# Patient Record
Sex: FEMALE | Race: WHITE | NOT HISPANIC OR LATINO | Employment: FULL TIME | URBAN - METROPOLITAN AREA
[De-identification: names, ages, dates, MRNs, and addresses within clinical notes are randomized per-mention and may not be internally consistent; named-entity substitution may affect disease eponyms.]

---

## 2017-01-25 ENCOUNTER — OFFICE VISIT (OUTPATIENT)
Dept: OTHER | Facility: OUTPATIENT CENTER | Age: 32
End: 2017-01-25

## 2017-01-25 DIAGNOSIS — F41.8 SITUATIONAL ANXIETY: Primary | ICD-10-CM

## 2017-01-25 DIAGNOSIS — F33.1 MAJOR DEPRESSIVE DISORDER, RECURRENT EPISODE, MODERATE (H): ICD-10-CM

## 2017-01-25 DIAGNOSIS — Z63.0 PARTNER RELATIONAL PROBLEM: ICD-10-CM

## 2017-01-25 SDOH — SOCIAL STABILITY - SOCIAL INSECURITY: PROBLEMS IN RELATIONSHIP WITH SPOUSE OR PARTNER: Z63.0

## 2017-01-26 NOTE — PROGRESS NOTES
"Program [facility]   [facility]  [address], [address]  [place] [address]  Outpatient Progress Note    Session Date: 1/25/17  Client Name: Vera Kirk  YOB: 1985  MRN: [id number]  Provider: Maria Dolores Rivers, PhD, LP  Type of Session: Individual  Present in Session: Client only  Number of Minutes: 50  Treatment Plan Due: 06/06/2017    Current Symptoms/Status:  Vera is a 30 year old, who self-identifies as a queer, , partnered, non-monogamous cisgender woman. Since intake, Vera has ended her relationship with primary sexual partner, Wilman, and desires to work on healthy decoupling while still living in the same home. Vera describes a historical pattern of decreased sexual desire and difficulty with intimacy or \"going deeper\" in long-term relationships. In addition to sexual and relationship concerns, Vera endorsed a moderate level of depressive symptoms and mild level of anxiety symptoms, including: anhedonia; feeling down, depressed, or hopeless; fatigue; poor appetite; feeling bad about oneself or that you are a failure; feeling nervous, anxious, or on edge; difficulty controlling worry; and irritability.     Progress Toward Treatment Goals:   Increased healthy communication with ex-partner (who remains a housemate), Wilman. Working to address difficulty with relational compromise, communication, and emotional intimacy, as well as healthy decoupling     Intervention & Response:  Cognitive behavioral, interpersonal, and supportive techniques utilized; Per last session, Vera reported that she did speak with her partner, Jayla, about  red flags  she has felt surrounding Jayla s male partner. Processed this experience, which reportedly went well. Discussed that Vera continues to feel  reactive  around Wilman due to sharing their home post-break-up. Normalized and validated these reactions and the difficulty of the living situation. Discussed ways in which " "Vera and Wilman might connect and spend more quality time together (e.g., cabin trip this weekend). Identified the importance, however, of discussing expectations and boundaries with Wilman up front should the two go on a small trip together. Vera was open and receptive to this feedback. 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 Explored that this seems most related to Vera feeling stagnant and  bored  in her life.  Identified this as a common occurrence for her, especially a desire for  new energy  in her life. Vera shared that she is grateful not to be feeling \"severely\" depressed (as she did last winter) but does want to try some new activities and meet new people to further address the \"low\" feeling. This was reinforced and encouraged. Discussed that a decrease in her work hours (starting this week) should help with socialization and self-care. Vera shared a list of related goals that she created to help manage her anxiety around boredom and  doing nothing.  Discussed using MeetUp to socialize and try new activities, and Vera noted a weaving class that she would like to take. Assisted her in making plans to begin this week. Explored the connection between anxiety/guilt and lack of productivity for Vera, including how this might increase now that she will be working less. Assisted her in creating plans for  accountability  from her housemates, who she will ask to check in with her when she has been  lounging  or  doing nothing  for more than 2 hours on any given day. She identified Jayla as a potential source of support for this accountability goal. Vera reported difficulty scheduling IPC vocational assessment, as previously assigned, but has plans to try again. Overall, she was active and engaged throughout session.      Assignment:  (1). Ongoing: Identifying and noting anxiety levels when in communication with sexual/romantic partners  (2). Ongoing: HeadSpace " mindfulness exercises - twice/week  (3). Begin writing sexual history, ages 0-10 - bring to next session.   (4). Tracking guilt/shame/anxiety and checking in on related assumptions    Diagnosis:  300.09 (F41.8) - Other specified anxiety disorder, generalized anxiety not occuring more days than not  296.32 (F33.1) - Major depressive disorder, recurrent, moderate  V61.10 (Z63.0) - Partner relational concerns    R/O - Generalized anxiety disorder    Interactive Complexity:  None    Plan / Need for Future Services:  Return for family/individual therapy in 1-2 weeks.           Maria Dolores Rivers, PhD, LP  Licensed Psychologist

## 2017-02-22 ENCOUNTER — OFFICE VISIT (OUTPATIENT)
Dept: OTHER | Facility: OUTPATIENT CENTER | Age: 32
End: 2017-02-22

## 2017-02-22 DIAGNOSIS — Z63.0 PARTNER RELATIONAL PROBLEM: ICD-10-CM

## 2017-02-22 DIAGNOSIS — F41.8 SITUATIONAL ANXIETY: Primary | ICD-10-CM

## 2017-02-22 DIAGNOSIS — F33.1 MAJOR DEPRESSIVE DISORDER, RECURRENT EPISODE, MODERATE (H): ICD-10-CM

## 2017-02-22 SDOH — SOCIAL STABILITY - SOCIAL INSECURITY: PROBLEMS IN RELATIONSHIP WITH SPOUSE OR PARTNER: Z63.0

## 2017-02-22 NOTE — MR AVS SNAPSHOT
After Visit Summary   2/22/2017    Vera Kirk    MRN: [id number]           Patient Information     Date Of Birth          1985        Visit Information        Provider Department      2/22/2017 3:00 PM Maria Dolores Rivers, PhD [facility]        Today's Diagnoses     Situational anxiety    -  1    Major depressive disorder, recurrent episode, moderate (H)        Partner relational problem           Follow-ups after your visit        Your next 10 appointments already scheduled     Mar 08, 2017  1:00 PM CST   INDIVIDUAL THERAPY with Maria Dolores Rivers, PhD   [facility] ([facility])    [address]  [address]  [facility] [address]   [phone]            Mar 20, 2017 11:00 AM CDT   INDIVIDUAL THERAPY with Maria Dolores Rivers, PhD   [facility] ([facility])    [address]  [address]  [facility] [address]   [phone]            Apr 06, 2017  2:00 PM CDT   INDIVIDUAL THERAPY with Maria Dolores Rivers, PhD   [facility] ([facility])    [address]  [address]  [facility] [address]   [phone]            Apr 13, 2017  3:00 PM CDT   INDIVIDUAL THERAPY with Maria Dolores Rivers, PhD   [facility] ([facility])    [address]  [address]  [facility] [address]   [phone]              Who to contact     Please call your clinic at [phone] to:    Ask questions about your health    Make or cancel appointments    Discuss your medicines    Learn about your test results    Speak to your doctor   If you have compliments or concerns about an experience at your clinic, or if you wish to file a complaint, please contact [facility] Patient Relations at [phone] or email us at [email or web address]         Additional Information About Your Visit        MyChart Information     Rajendra is an  electronic gateway that provides easy, online access to your medical records. With [employer], you can request a clinic appointment, read your test results, renew a prescription or communicate with your care team.     To sign up for [employer] visit the website at [email or web address]   You will be asked to enter the access code listed below, as well as some personal information. Please follow the directions to create your username and password.     Your access code is: [email or web address]  Expires: 2017  9:49 AM     Your access code will  in 90 days. If you need help or a new code, please contact your [facility] or call [phone] for assistance.        Care EveryWhere ID     This is your Care EveryWhere ID. This could be used by other organizations to access your [facility] medical records  [id number]         Blood Pressure from Last 3 Encounters:   16 112/76   16 112/78   16 111/74    Weight from Last 3 Encounters:   16 95.3 kg (210 lb)   16 99.9 kg (220 lb 5 oz)   16 100.5 kg (221 lb 8 oz)              We Performed the Following     Individual Psychotherapy (38-52 min) [[id number]]        Primary Care Provider Office Phone # Fax #    Luh Mallory [phone]       [facility] [address]  [facility] [address]        Thank you!     Thank you for choosing [facility]  for your care. Our goal is always to provide you with excellent care. Hearing back from our patients is one way we can continue to improve our services. Please take a few minutes to complete the written survey that you may receive in the mail after your visit with us. Thank you!             Your Updated Medication List - Protect others around you: Learn how to safely use, store and throw away your medicines at www.disposemymeds.org.          This list is accurate as of: 17 11:59 PM.  Always use your most recent med list.                    Brand Name Dispense Instructions for use    EPINEPHrine 0.3 MG/0.3ML injection     2 each    Inject 0.3 mLs (0.3 mg) into the muscle once as needed for anaphylaxis       gabapentin 300 MG capsule    NEURONTIN    90 capsule    Take 1 capsule (300 mg) by mouth 3 times daily       LORazepam 1 MG tablet    ATIVAN    6 tablet    Take 1 tablet (1 mg) by mouth every 8 hours as needed for anxiety       metroNIDAZOLE 500 MG tablet    FLAGYL    14 tablet    Take 1 tablet (500 mg) by mouth 2 times daily

## 2017-02-23 NOTE — PROGRESS NOTES
"Program in Human Sexuality   [facility]  [address], [address]  [place] [address]  Outpatient Progress Note    Session Date: 2/22/17  Client Name: Vera Kirk  YOB: 1985  MRN: [id number]  Provider: Maria Dolores Rivers, PhD, LP  Type of Session: Individual  Present in Session: Client only  Number of Minutes: 50  Treatment Plan Due: 06/06/2017    Current Symptoms/Status:  Vera is a 30 year old, who self-identifies as a queer, , partnered, non-monogamous cisgender woman. Since intake, Vera has ended her relationship with primary sexual partner, Wilman, and desires to work on healthy decoupling while still living in the same home. Vera describes a historical pattern of decreased sexual desire and difficulty with intimacy or \"going deeper\" in long-term relationships. In addition to sexual and relationship concerns, Vera endorsed a moderate level of depressive symptoms and mild level of anxiety symptoms, including: anhedonia; feeling down, depressed, or hopeless; fatigue; poor appetite; feeling bad about oneself or that you are a failure; feeling nervous, anxious, or on edge; difficulty controlling worry; and irritability.     Progress Toward Treatment Goals:   Increased healthy communication with ex-partner (who remains a housemate), Wilman. Working to address difficulty with relational compromise, communication, and emotional intimacy, as well as healthy decoupling     Intervention & Response:  Cognitive behavioral, interpersonal, and supportive techniques utilized; Vera reported that she did not accomplish homework assignments, per last session, and this was discussed. Processed Vera's mixed reactions to partner Jayla s pregnancy and anxiety about how Jayla's male partner/father of the baby is now presumably part of Vera s life  forever.  Explored ways that Vera might address her anxieties about co-parenting with Jayla. Celebrated that Vera has been able to " "engage in more self-care and relaxation since she has decreased her work hours (e.g., spending time at a cabin, socializing). She reported largely feeling positive about this, though remains aware of experiencing some anxiety when she is not feeling  productive  enough and/or bored. Discussed how she might develop accountability partners specific to the tasks she would like to accomplish but often does not. Related to this \"to-do\" list, explored the difference between  I should  and  I want to  statements (e.g., that Vera often \"shoulds\" herself about activities that are really optional and meant to be fun). Vera was open to beginning to track \"should\" statements for homework in order to notice how they correspond with her anxiety and difficulty accomplishing goals. Discussed that dynamics with Wilman in the house continue to be somewhat tense due the ex-partners' tendency to have the same processing talks about incompatible communication styles. Identified and discussed the importance, for now, of focusing time spent together away from this piece of the relationship and onto enjoyable events/activities, as this can allow for greater quality time. Vera was open to this feedback and, overall, was active and engaged throughout session.       Assignments:  (1). Ongoing: HeadSpace mindfulness exercises - twice/week  (2). IPC vocational assessment  (3). Tracking \"should\" statements and how these relate to anxiety  Not completed from previous sessions: Begin writing sexual history, ages 0-10 - bring to next session.     Diagnosis:  300.09 (F41.8) - Other specified anxiety disorder, generalized anxiety not occuring more days than not  296.32 (F33.1) - Major depressive disorder, recurrent, moderate  V61.10 (Z63.0) - Partner relational concerns    R/O - Generalized anxiety disorder    Interactive Complexity:  None    Plan / Need for Future Services:  Return for family/individual therapy in 1-2 weeks.           Maria Dolores CAMILO" Silvestre, PhD, LP  Licensed Psychologist

## 2017-03-08 ENCOUNTER — OFFICE VISIT (OUTPATIENT)
Dept: OTHER | Facility: OUTPATIENT CENTER | Age: 32
End: 2017-03-08

## 2017-03-08 DIAGNOSIS — F41.8 SITUATIONAL ANXIETY: Primary | ICD-10-CM

## 2017-03-08 DIAGNOSIS — F33.1 MAJOR DEPRESSIVE DISORDER, RECURRENT EPISODE, MODERATE (H): ICD-10-CM

## 2017-03-08 DIAGNOSIS — Z63.0 PARTNER RELATIONAL PROBLEM: ICD-10-CM

## 2017-03-08 SDOH — SOCIAL STABILITY - SOCIAL INSECURITY: PROBLEMS IN RELATIONSHIP WITH SPOUSE OR PARTNER: Z63.0

## 2017-03-08 NOTE — MR AVS SNAPSHOT
After Visit Summary   3/8/2017    Vera Kirk    MRN: [id number]           Patient Information     Date Of Birth          1985        Visit Information        Provider Department      3/8/2017 1:00 PM Maria Dolores Rivers, PhD [facility]        Today's Diagnoses     Situational anxiety    -  1    Major depressive disorder, recurrent episode, moderate (H)        Partner relational problem           Follow-ups after your visit        Your next 10 appointments already scheduled     Apr 06, 2017  2:00 PM CDT   INDIVIDUAL THERAPY with Maria Dolores Rivers, PhD   [facility] ([facility])    [address]  [address]  [facility] [address]   [phone]            Apr 13, 2017  3:00 PM CDT   INDIVIDUAL THERAPY with Maria Dolores Rivers, PhD   [facility] ([facility])    [address]  [address]  [facility] [address]   [phone]            May 01, 2017  5:00 PM CDT   INDIVIDUAL THERAPY with Maria Dolores Rivers, PhD   [facility] ([facility])    [address]  [address]  [facility] [address]   [phone]            May 22, 2017 10:00 AM CDT   INDIVIDUAL THERAPY with Maria Dolores Rivers, PhD   [facility] ([facility])    [address]  [address]  [facility] [address]   [phone]              Who to contact     Please call your clinic at [phone] to:    Ask questions about your health    Make or cancel appointments    Discuss your medicines    Learn about your test results    Speak to your doctor   If you have compliments or concerns about an experience at your clinic, or if you wish to file a complaint, please contact [facility] Patient Relations at [phone] or email us at [email or web address]         Additional Information About Your Visit        MyChart Information     Rajendra is an  electronic gateway that provides easy, online access to your medical records. With Network, you can request a clinic appointment, read your test results, renew a prescription or communicate with your care team.     To sign up for Network visit the website at [email or web address]   You will be asked to enter the access code listed below, as well as some personal information. Please follow the directions to create your username and password.     Your access code is: [email or web address]  Expires: 2017 10:49 AM     Your access code will  in 90 days. If you need help or a new code, please contact your [facility] or call [phone] for assistance.        Care EveryWhere ID     This is your Care EveryWhere ID. This could be used by other organizations to access your [facility] medical records  [id number]         Blood Pressure from Last 3 Encounters:   16 112/76   16 112/78   16 111/74    Weight from Last 3 Encounters:   16 95.3 kg (210 lb)   16 99.9 kg (220 lb 5 oz)   16 100.5 kg (221 lb 8 oz)              We Performed the Following     Psychotherapy Family/Couple with Patient [[id number]]        Primary Care Provider Office Phone # Fax #    Luh Mallory [phone]       [facility] [address]  [facility] [address]        Thank you!     Thank you for choosing [facility]  for your care. Our goal is always to provide you with excellent care. Hearing back from our patients is one way we can continue to improve our services. Please take a few minutes to complete the written survey that you may receive in the mail after your visit with us. Thank you!             Your Updated Medication List - Protect others around you: Learn how to safely use, store and throw away your medicines at www.disposemymeds.org.          This list is accurate as of: 3/8/17 11:59 PM.  Always use your most recent med  list.                   Brand Name Dispense Instructions for use    EPINEPHrine 0.3 MG/0.3ML injection     2 each    Inject 0.3 mLs (0.3 mg) into the muscle once as needed for anaphylaxis       gabapentin 300 MG capsule    NEURONTIN    90 capsule    Take 1 capsule (300 mg) by mouth 3 times daily       LORazepam 1 MG tablet    ATIVAN    6 tablet    Take 1 tablet (1 mg) by mouth every 8 hours as needed for anxiety       metroNIDAZOLE 500 MG tablet    FLAGYL    14 tablet    Take 1 tablet (500 mg) by mouth 2 times daily

## 2017-03-20 ENCOUNTER — OFFICE VISIT (OUTPATIENT)
Dept: OTHER | Facility: OUTPATIENT CENTER | Age: 32
End: 2017-03-20

## 2017-03-20 DIAGNOSIS — F33.1 MAJOR DEPRESSIVE DISORDER, RECURRENT EPISODE, MODERATE (H): ICD-10-CM

## 2017-03-20 DIAGNOSIS — F41.8 SITUATIONAL ANXIETY: Primary | ICD-10-CM

## 2017-03-20 DIAGNOSIS — Z63.0 PARTNER RELATIONAL PROBLEM: ICD-10-CM

## 2017-03-20 SDOH — SOCIAL STABILITY - SOCIAL INSECURITY: PROBLEMS IN RELATIONSHIP WITH SPOUSE OR PARTNER: Z63.0

## 2017-03-20 NOTE — PROGRESS NOTES
"Program in Human Sexuality   [facility]  [address], [address]  [place] [address]  Outpatient Progress Note    Session Date: 3/08/17  Client Name: Vera Kirk  YOB: 1985  MRN: [id number]  Provider: Maria Dolores Rivers, PhD, LP  Type of Session: Family therapy with client present  Present in Session: Client and client's housemate and ex-partner, Wilman  Number of Minutes: 55  Treatment Plan Due: 06/06/2017    Current Symptoms/Status:  Vera is a 30 year old, who self-identifies as a queer, , partnered, non-monogamous cisgender woman. Since intake, Vera has ended her relationship with primary sexual partner, Wilman, and desires to work on healthy decoupling while still living in the same home. Vera describes a historical pattern of decreased sexual desire and difficulty with intimacy or \"going deeper\" in long-term relationships. In addition to sexual and relationship concerns, Vera endorsed a moderate level of depressive symptoms and mild level of anxiety symptoms, including: anhedonia; feeling down, depressed, or hopeless; fatigue; poor appetite; feeling bad about oneself or that you are a failure; feeling nervous, anxious, or on edge; difficulty controlling worry; and irritability.     Progress Toward Treatment Goals:   Increased healthy communication with ex-partner (who remains a housemate), Wilman. Working to address difficulty with relational compromise, communication, and emotional intimacy, as well as healthy decoupling     Intervention & Response:  Cognitive behavioral, interpersonal, and family systems/therapy techniques utilized; Therapist and family continued to explore and process household and relationship dynamics post-breakup. Both partners identified ongoing tensions related to living together (that also lead to greater anxiety for Vera). Therapist assisted each partner in identifying and exploring the \"lens\" that they bring to the relationship, which " "often results in inaccurate assumptions about the other person's motivations and behaviors, and arguments. For Vera, discussed that this \"lens\" frequently centers around a belief that Wilman is upset/angry/annoyed with her when they are not. Discussed, on the other hand, that Wilman's lens is typically that Vera isn't trying hard in the relationship or putting forth effort to prioritize Wilman. Vera was able to identify how her lens relates to increased anxiety and, in today's session, named feeling anxious in the moment about the discussion of her patterns with Wilman. Discussed beginning to work together to label their \"lenses\" aloud and better identify assumptions in the moment, especially when the relationship feels tense, in order to improve communication. Both partners were open to feedback, active, and engaged throughout session.       Assignments:  (1). Ongoing: HeadSpace mindfulness exercises - twice/week  (2). IPC vocational assessment  (3). Tracking \"should\" statements and how these relate to anxiety  Not completed from previous sessions: Begin writing sexual history, ages 0-10 - bring to next session.     Diagnosis:  300.09 (F41.8) - Other specified anxiety disorder, generalized anxiety not occuring more days than not  296.32 (F33.1) - Major depressive disorder, recurrent, moderate  V61.10 (Z63.0) - Partner relational concerns    R/O - Generalized anxiety disorder    Interactive Complexity:  None    Plan / Need for Future Services:  Return for family/individual therapy in 1-2 weeks.           Maria Dolores Rivers, PhD, LP  Licensed Psychologist  "

## 2017-03-20 NOTE — MR AVS SNAPSHOT
After Visit Summary   3/20/2017    Vera Kirk    MRN: [id number]           Patient Information     Date Of Birth          1985        Visit Information        Provider Department      3/20/2017 11:00 AM Maria Dolores Rivers, PhD [facility]        Today's Diagnoses     Situational anxiety    -  1    Major depressive disorder, recurrent episode, moderate (H)        Partner relational problem           Follow-ups after your visit        Your next 10 appointments already scheduled     Apr 06, 2017  2:00 PM CDT   INDIVIDUAL THERAPY with Maria Dolores Rivers, PhD   [facility] ([facility])    [address]  [address] 75[address]  [facility] [address]   [phone]            Apr 13, 2017  3:00 PM CDT   INDIVIDUAL THERAPY with Maria Dolores Rivers, PhD   [facility] ([facility])    [address]  [address]  [facility] [address]   [phone]            May 01, 2017  5:00 PM CDT   INDIVIDUAL THERAPY with Maria Dolores Rivers, PhD   [facility] ([facility])    [address]  [address]  [facility] [address]   [phone]            May 22, 2017 10:00 AM CDT   INDIVIDUAL THERAPY with Maria Dolores Rivers, PhD   [facility] ([facility])    [address]  [address]  [facility] [address]   [phone]              Who to contact     Please call your clinic at [phone] to:    Ask questions about your health    Make or cancel appointments    Discuss your medicines    Learn about your test results    Speak to your doctor   If you have compliments or concerns about an experience at your clinic, or if you wish to file a complaint, please contact [facility] Patient Relations at [phone] or email us at [email or web address]         Additional Information About Your Visit        MyChart Information     MyChart is  an electronic gateway that provides easy, online access to your medical records. With Upverter, you can request a clinic appointment, read your test results, renew a prescription or communicate with your care team.     To sign up for Upverter visit the website at [email or web address]   You will be asked to enter the access code listed below, as well as some personal information. Please follow the directions to create your username and password.     Your access code is: [email or web address]  Expires: 2017 10:49 AM     Your access code will  in 90 days. If you need help or a new code, please contact your [facility] or call [phone] for assistance.        Care EveryWhere ID     This is your Care EveryWhere ID. This could be used by other organizations to access your [facility] medical records  [id number]         Blood Pressure from Last 3 Encounters:   16 112/76   16 112/78   16 111/74    Weight from Last 3 Encounters:   16 95.3 kg (210 lb)   16 99.9 kg (220 lb 5 oz)   16 100.5 kg (221 lb 8 oz)              We Performed the Following     Individual Psychotherapy (53+ min) [69130]        Primary Care Provider Office Phone # Fax #    Luh Mallory [phone]       [facility] [address]  [facility] [address]        Thank you!     Thank you for choosing [facility]  for your care. Our goal is always to provide you with excellent care. Hearing back from our patients is one way we can continue to improve our services. Please take a few minutes to complete the written survey that you may receive in the mail after your visit with us. Thank you!             Your Updated Medication List - Protect others around you: Learn how to safely use, store and throw away your medicines at www.disposemymeds.org.          This list is accurate as of: 3/20/17  5:35 PM.  Always use your most recent med list.                    Brand Name Dispense Instructions for use    EPINEPHrine 0.3 MG/0.3ML injection     2 each    Inject 0.3 mLs (0.3 mg) into the muscle once as needed for anaphylaxis       gabapentin 300 MG capsule    NEURONTIN    90 capsule    Take 1 capsule (300 mg) by mouth 3 times daily       LORazepam 1 MG tablet    ATIVAN    6 tablet    Take 1 tablet (1 mg) by mouth every 8 hours as needed for anxiety       metroNIDAZOLE 500 MG tablet    FLAGYL    14 tablet    Take 1 tablet (500 mg) by mouth 2 times daily

## 2017-03-20 NOTE — PROGRESS NOTES
"Program in Human Sexuality   [facility]  [address], [address]  [place] [address]  Outpatient Progress Note    Session Date: 3/20/17  Client Name: Vera Kirk  YOB: 1985  MRN: [id number]  Provider: Maria Dolores Rivers, PhD, LP  Type of Session: Individual therapy  Present in Session: Client only  Number of Minutes: 55  Treatment Plan Due: 06/06/2017    Current Symptoms/Status:  Vera is a 30 year old, who self-identifies as a queer, , partnered, non-monogamous cisgender woman. Since intake, Vera has ended her relationship with primary sexual partner, Wilman, and desires to work on healthy decoupling while still living in the same home. Vera describes a historical pattern of decreased sexual desire and difficulty with intimacy or \"going deeper\" in long-term relationships. In addition to sexual and relationship concerns, Vera endorsed a moderate level of depressive symptoms and mild level of anxiety symptoms, including: anhedonia; feeling down, depressed, or hopeless; fatigue; poor appetite; feeling bad about oneself or that you are a failure; feeling nervous, anxious, or on edge; difficulty controlling worry; and irritability.     Progress Toward Treatment Goals:   Increased healthy communication with ex-partner (who remains a housemate), Wilman. Working to address anxiety management, difficulty with relational compromise, communication, and emotional intimacy, as well as healthy decoupling     Intervention & Response:  Cognitive behavioral, interpersonal, and supportive techniques utilized; Therapist and client processed a positive encounter between Vera and Wilman since last (family) session, in which Vera was challenged to  check my lens  and how she was interpreting their conversation. Identified that she found this to be helpful in keeping her more aware of her assumptions about/toward Wilman and their interactions. Discussed intentional steps that Vera has been " "taking lately (e.g., attending pregnancy classes) to feel more connected and a part of her partner Jayla s pregnancy. Identified and discussed Vera's anxiety about not being visible as a co-parent given her lack of biological  attachment  to the baby (in comparison to the baby s biological father, Jayla's other partner). Vera was open to speaking with Jayla about these anxieties and participated in brainstorming about how to best communicate her feelings. She planned to raise the discussion next week when on vacation with Jayla. Therapist and client took time to reassess and discuss Vera's treatment goals, as things are going  pretty well  and Vera noted feeling like she often has little to discuss in individual sessions. Identified and discussed ongoing goals around continuing to improve anxiety, working to connect with Wilman in a positive manner, fitness for both physical health and anxiety reduction, and career concerns. Regarding anxiety, explored Vera's desire to repurpose her previous guilt/shame tracking log to focus more specifically on anxiety symptoms and her tendency to neglect self-care and anxiety management even when she knows it will help her. Explored and identified that this pattern feels related to guilt and has a \"punishing\" quality, in which Vera often believes she \"deserves to\" or \"should\" feel anxious. She was open to beginning this tracking log for homework. Discussed that Vera has not followed up with [employer] vocational assessment and is concerned about a wait list. 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 Identified and normalized feelings of overwhelm about this process, and Vera was open to challenge of her future-focused rumination and that she does not, in reality, know what steps are needed. Overall, she was open to feedback, active, and engaged " throughout session.       Assignments:  (1). Ongoing: HeadSpace mindfulness exercises - twice/week  (2). Schedule [employer] vocational assessment. 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   (3). Anxiety tracking log, including assessment of reduction techniques and whether these are attempted.   Not completed from previous sessions: Begin writing sexual history, ages 0-10 - bring to next session.     Diagnosis:  300.09 (F41.8) - Other specified anxiety disorder, generalized anxiety not occuring more days than not  296.32 (F33.1) - Major depressive disorder, recurrent, moderate  V61.10 (Z63.0) - Partner relational concerns    R/O - Generalized anxiety disorder    Interactive Complexity:  None    Plan / Need for Future Services:  Return for family/individual therapy in 2-3 weeks.           Maria Dolores Rivers, PhD,   Licensed Psychologist

## 2017-04-06 ENCOUNTER — TELEPHONE (OUTPATIENT)
Dept: OTHER | Facility: OUTPATIENT CENTER | Age: 32
End: 2017-04-06

## 2017-04-06 NOTE — TELEPHONE ENCOUNTER
Called Vera to check-in regarding today's no-showed therapy appt. No answer, left voicemail encouraging call back to reschedule.       Maria Dolores Rivers, PhD, LP  Licensed Psychologist

## 2017-04-13 ENCOUNTER — OFFICE VISIT (OUTPATIENT)
Dept: OTHER | Facility: OUTPATIENT CENTER | Age: 32
End: 2017-04-13

## 2017-04-13 DIAGNOSIS — F33.1 MAJOR DEPRESSIVE DISORDER, RECURRENT EPISODE, MODERATE (H): ICD-10-CM

## 2017-04-13 DIAGNOSIS — F41.8 SITUATIONAL ANXIETY: Primary | ICD-10-CM

## 2017-04-13 DIAGNOSIS — Z63.0 PARTNER RELATIONAL PROBLEM: ICD-10-CM

## 2017-04-13 SDOH — SOCIAL STABILITY - SOCIAL INSECURITY: PROBLEMS IN RELATIONSHIP WITH SPOUSE OR PARTNER: Z63.0

## 2017-04-13 NOTE — MR AVS SNAPSHOT
After Visit Summary   4/13/2017    Vera Kirk    MRN: [id number]           Patient Information     Date Of Birth          1985        Visit Information        Provider Department      4/13/2017 3:00 PM Maria Dolores Rivers, PhD [facility]        Today's Diagnoses     Situational anxiety    -  1    Major depressive disorder, recurrent episode, moderate (H)        Partner relational problem           Follow-ups after your visit        Your next 10 appointments already scheduled     May 01, 2017  5:00 PM CDT   INDIVIDUAL THERAPY with Maria Dolores Rivers, PhD   [facility] ([facility])    [address]  [address] 7521  [facility] [address]   [phone]            May 22, 2017 10:00 AM CDT   INDIVIDUAL THERAPY with Maria Dolores Rivers, PhD   [facility] ([facility])    [address]  [address]  [facility] [address]   [phone]              Who to contact     Please call your clinic at [phone] to:    Ask questions about your health    Make or cancel appointments    Discuss your medicines    Learn about your test results    Speak to your doctor   If you have compliments or concerns about an experience at your clinic, or if you wish to file a complaint, please contact [facility] Patient Relations at [phone] or email us at [email or web address]         Additional Information About Your Visit        MyChart Information     JustBook is an electronic gateway that provides easy, online access to your medical records. With JustBook, you can request a clinic appointment, read your test results, renew a prescription or communicate with your care team.     To sign up for Live Matrixt visit the website at [email or web address]   You will be asked to enter the access code listed below, as well as some personal information. Please follow the directions to create your username  and password.     Your access code is: [email or web address]  Expires: 2017 10:49 AM     Your access code will  in 90 days. If you need help or a new code, please contact your [facility] or call [phone] for assistance.        Care EveryWhere ID     This is your Care EveryWhere ID. This could be used by other organizations to access your [facility] medical records  [id number]         Blood Pressure from Last 3 Encounters:   16 112/76   16 112/78   16 111/74    Weight from Last 3 Encounters:   16 95.3 kg (210 lb)   16 99.9 kg (220 lb 5 oz)   16 100.5 kg (221 lb 8 oz)              We Performed the Following     Individual Psychotherapy (53+ min) [28286]        Primary Care Provider Office Phone # Fax #    Luh Mallory [phone]       [place] [id number] [facility] [address]        Thank you!     Thank you for choosing [facility]  for your care. Our goal is always to provide you with excellent care. Hearing back from our patients is one way we can continue to improve our services. Please take a few minutes to complete the written survey that you may receive in the mail after your visit with us. Thank you!             Your Updated Medication List - Protect others around you: Learn how to safely use, store and throw away your medicines at www.disposemymeds.org.          This list is accurate as of: 17 11:59 PM.  Always use your most recent med list.                   Brand Name Dispense Instructions for use    EPINEPHrine 0.3 MG/0.3ML injection     2 each    Inject 0.3 mLs (0.3 mg) into the muscle once as needed for anaphylaxis       gabapentin 300 MG capsule    NEURONTIN    90 capsule    Take 1 capsule (300 mg) by mouth 3 times daily       LORazepam 1 MG tablet    ATIVAN    6 tablet    Take 1 tablet (1 mg) by mouth every 8 hours as needed for anxiety       metroNIDAZOLE 500 MG  tablet    FLAGYL    14 tablet    Take 1 tablet (500 mg) by mouth 2 times daily

## 2017-04-18 NOTE — PROGRESS NOTES
"Program in Human Sexuality   [facility]  [address], [address]  [place] [address]  Outpatient Progress Note    Session Date: 4/13/17  Client Name: Vera Kirk  YOB: 1985  MRN: [id number]  Provider: Maria Dolores Rivers, PhD, LP  Type of Session: Individual therapy  Present in Session: Client only  Number of Minutes: 55  Treatment Plan Due: 06/06/2017    Current Symptoms/Status:  Vera is a 30 year old, who self-identifies as a queer, , partnered, non-monogamous cisgender woman. Since intake, Vera has ended her relationship with primary sexual partner, Wilman, and desires to work on healthy decoupling while still living in the same home. Vera describes a historical pattern of decreased sexual desire and difficulty with intimacy or \"going deeper\" in long-term relationships. In addition to sexual and relationship concerns, Vera endorsed a moderate level of depressive symptoms and mild level of anxiety symptoms, including: anhedonia; feeling down, depressed, or hopeless; fatigue; poor appetite; feeling bad about oneself or that you are a failure; feeling nervous, anxious, or on edge; difficulty controlling worry; and irritability.     Progress Toward Treatment Goals:   Increased healthy communication with ex-partner (who remains a housemate), Wilman. Working to address anxiety management, difficulty with relational compromise, communication, and emotional intimacy, as well as healthy decoupling     Intervention & Response:  Cognitive behavioral, interpersonal, and supportive techniques utilized; Vera shared a positive experience spending time recently with Wilman and reported that the relationship is feeling  less dramatic  and tense. Per last session, she noted touching base about co-parenting with Jayla. Identified and discussed that Vera has not held any co-parenting conversations with both Jayla and the baby's biological father, and is unsure why she would need to do " so. She was receptive to therapist's feedback around planning for co-parenting with all relevant parties, despite her dislike for the biological father. Vera shared that she has not completed any therapy homework. Discussed barriers to completing therapeutic assignments, given this ongoing pattern, and continued to explore themes related to feeling lazy/unmotivated and that she  should  be anxious/depressed. Therapist challenged Vera on the need for more concrete, measurable therapeutic goals related to mental health care. Psychoeducation was provided on the importance of such clarification versus a goal of  feeling better.  Therapist also discussed with Vera stages of change and the importance of personalization in the change/goal-setting process. Vera participated in some brainstorming as to how this might work best for her (e.g., leaving notes about behavior change goals for herself in a conspicuous place). She committed to reading more about goal-setting for homework and further considering methods that may be helpful to her (eg., calendar alerts, leaving messages for herself as reminders, etc.). Validated, reflected, and normalized client's feelings throughout session. Overall, she was open to feedback, active, and engaged throughout session.       Assignments:  (1). Ongoing: HeadSpace mindfulness exercises - twice/week  (2). Schedule [employer] vocational assessment. 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   (3). Anxiety tracking log, including assessment of reduction techniques and whether these are attempted.   Not completed from previous sessions: Begin writing sexual history, ages 0-10 - bring to next session.     Diagnosis:  300.09 (F41.8) - Other specified anxiety disorder, generalized anxiety not occuring more days than not  296.32 (F33.1) - Major depressive disorder, recurrent, moderate  V61.10 (Z63.0) - Partner relational concerns    R/O - Generalized anxiety  disorder    Interactive Complexity:  None    Plan / Need for Future Services:  Return for family/individual therapy in 2-3 weeks.           Maria Dolores Rivers, PhD, LP  Licensed Psychologist

## 2017-04-25 ENCOUNTER — TELEPHONE (OUTPATIENT)
Dept: OTHER | Facility: OUTPATIENT CENTER | Age: 32
End: 2017-04-25

## 2017-07-15 ENCOUNTER — HEALTH MAINTENANCE LETTER (OUTPATIENT)
Age: 32
End: 2017-07-15

## 2017-10-11 ENCOUNTER — TELEPHONE (OUTPATIENT)
Dept: FAMILY MEDICINE | Facility: CLINIC | Age: 32
End: 2017-10-11

## 2017-10-11 NOTE — TELEPHONE ENCOUNTER
Pt is past due for f/u pap smear.  Reminder letter was sent 03/08/17.  LMTC and schedule at [facility].  Left this writer's number in case of questions ([phone]).  If no reply and/or appt within 2 weeks (10/25/17) pt will be considered lost to pap tracking f/u.  Lois Cervantes,    Pap Tracking

## 2018-06-28 ENCOUNTER — MEDICAL CORRESPONDENCE (OUTPATIENT)
Dept: HEALTH INFORMATION MANAGEMENT | Facility: CLINIC | Age: 33
End: 2018-06-28

## 2018-07-22 ENCOUNTER — HEALTH MAINTENANCE LETTER (OUTPATIENT)
Age: 33
End: 2018-07-22

## 2018-08-14 ENCOUNTER — DOCUMENTATION ONLY (OUTPATIENT)
Dept: OTHER | Facility: OUTPATIENT CENTER | Age: 33
End: 2018-08-14

## 2018-08-15 NOTE — PROGRESS NOTES
Case Summary Record    Name:  Vera Kirk  Diagnosis:   300.09 (F41.8) - Other specified anxiety disorder, generalized anxiety not occuring more days than not  296.32 (F33.1) - Major depressive disorder, recurrent, moderate  V61.10 (Z63.0) - Partner relational concerns  R/O - Generalized anxiety disorder    Date of Summary:  8/14/2018   Date of Initial [facility] Appointment:  4/6/2016  Date of Last [facility] Appointment:  4/13/2017    Evaluation of Client Status    Total number of Individual, conjoint, group sessions patient attended:  20+ individual and family therapy sessions    Goals at intake were:   Assess and provide treatment suggestions  Prepare for therapy focused on mental and relationship health    At the time of discharge, the client reported the following progress:  N/A    Therapist Assessment:  Increased healthy communication with ex-partner (who remains a housemate). Working to address anxiety/stress management, difficulty with relational compromise, communication, and emotional intimacy, as well as healthy decoupling.     Prognosis:    Good    Referred to: N/A; Client did not return to clinic after letter was sent (see chart) regarding continued care. Case is being closed due to inactivity.         Maria Dolores Rivers, PhD, LP  Licensed Psychologist

## 2020-12-04 ENCOUNTER — VIRTUAL VISIT (OUTPATIENT)
Dept: FAMILY MEDICINE | Facility: CLINIC | Age: 35
End: 2020-12-04
Payer: COMMERCIAL

## 2020-12-04 VITALS — HEIGHT: 67 IN | WEIGHT: 240 LBS | BODY MASS INDEX: 37.67 KG/M2

## 2020-12-04 DIAGNOSIS — E73.9 LACTOSE INTOLERANCE: ICD-10-CM

## 2020-12-04 DIAGNOSIS — Z11.4 SCREENING FOR HIV (HUMAN IMMUNODEFICIENCY VIRUS): ICD-10-CM

## 2020-12-04 DIAGNOSIS — E66.9 OBESITY (BMI 35.0-39.9 WITHOUT COMORBIDITY): ICD-10-CM

## 2020-12-04 DIAGNOSIS — Z11.59 NEED FOR HEPATITIS C SCREENING TEST: ICD-10-CM

## 2020-12-04 DIAGNOSIS — G89.29 CHRONIC LOW BACK PAIN, UNSPECIFIED BACK PAIN LATERALITY, UNSPECIFIED WHETHER SCIATICA PRESENT: ICD-10-CM

## 2020-12-04 DIAGNOSIS — Z13.1 SCREENING FOR DIABETES MELLITUS: ICD-10-CM

## 2020-12-04 DIAGNOSIS — J34.89 DRY NARES: ICD-10-CM

## 2020-12-04 DIAGNOSIS — Z00.00 HEALTHCARE MAINTENANCE: ICD-10-CM

## 2020-12-04 DIAGNOSIS — Z13.220 SCREENING FOR HYPERLIPIDEMIA: ICD-10-CM

## 2020-12-04 DIAGNOSIS — E55.9 VITAMIN D DEFICIENCY: ICD-10-CM

## 2020-12-04 DIAGNOSIS — Z11.3 ROUTINE SCREENING FOR STI (SEXUALLY TRANSMITTED INFECTION): ICD-10-CM

## 2020-12-04 DIAGNOSIS — G89.29 CHRONIC BILATERAL LOW BACK PAIN WITHOUT SCIATICA: ICD-10-CM

## 2020-12-04 DIAGNOSIS — M54.50 CHRONIC BILATERAL LOW BACK PAIN WITHOUT SCIATICA: ICD-10-CM

## 2020-12-04 DIAGNOSIS — B00.9 HERPES SIMPLEX VIRUS INFECTION: Primary | ICD-10-CM

## 2020-12-04 DIAGNOSIS — M54.50 CHRONIC LOW BACK PAIN, UNSPECIFIED BACK PAIN LATERALITY, UNSPECIFIED WHETHER SCIATICA PRESENT: ICD-10-CM

## 2020-12-04 DIAGNOSIS — Z98.890 HISTORY OF LOOP ELECTRICAL EXCISION PROCEDURE (LEEP): ICD-10-CM

## 2020-12-04 PROCEDURE — 99204 OFFICE O/P NEW MOD 45 MIN: CPT | Mod: GT | Performed by: STUDENT IN AN ORGANIZED HEALTH CARE EDUCATION/TRAINING PROGRAM

## 2020-12-04 RX ORDER — SACCHAROMYCES BOULARDII 250 MG
250 CAPSULE ORAL 2 TIMES DAILY
Qty: 180 CAPSULE | Refills: 0 | COMMUNITY
Start: 2020-12-04 | End: 2024-07-15

## 2020-12-04 RX ORDER — CHOLECALCIFEROL (VITAMIN D3) 125 MCG
3000 CAPSULE ORAL
Qty: 270 TABLET | Refills: 0 | Status: SHIPPED | OUTPATIENT
Start: 2020-12-04 | End: 2024-07-15

## 2020-12-04 RX ORDER — OMEGA-3 FATTY ACIDS/FISH OIL 300-1000MG
200-400 CAPSULE ORAL EVERY 4 HOURS PRN
COMMUNITY
Start: 2020-12-04

## 2020-12-04 RX ORDER — EPINEPHRINE 0.3 MG/.3ML
INJECTION SUBCUTANEOUS
COMMUNITY
Start: 2020-06-17

## 2020-12-04 RX ORDER — CHOLECALCIFEROL (VITAMIN D3) 50 MCG
1 TABLET ORAL DAILY
Qty: 90 TABLET | Refills: 3 | COMMUNITY
Start: 2020-12-04 | End: 2024-07-15

## 2020-12-04 RX ORDER — VALACYCLOVIR HYDROCHLORIDE 1 G/1
1000 TABLET, FILM COATED ORAL DAILY
Qty: 5 TABLET | Refills: 0 | Status: SHIPPED | OUTPATIENT
Start: 2020-12-04 | End: 2024-07-15

## 2020-12-04 RX ORDER — MULTIVIT-MIN/IRON FUM/FOLIC AC 7.5 MG-4
1 TABLET ORAL DAILY
COMMUNITY
Start: 2020-12-04

## 2020-12-04 ASSESSMENT — MIFFLIN-ST. JEOR: SCORE: 1811.26

## 2020-12-04 NOTE — PROGRESS NOTES
Family Medicine Video Visit Note        Assessment and Plan   Vera presents to clinic today as a NEW patient for a complete physical exam. Overall, she is doing quite well.     1. Herpes simplex virus infection, latent  Patient-reported history of lab-confirmed HSV1. Never had genital outbreak. Outbreaks once-five times per year.   - valACYclovir (VALTREX) 1000 mg tablet; Take 1 tablet (1,000 mg) by mouth daily for 5 days  Dispense: 5 tablet; Refill: 0    2. Chronic bilateral low back pain, without sciatica present  Has history of bilateral sciatica, but no sciatica currently. Back pain comes and goes and is typically well-managed w/ ibuprofen and massage.   - valACYclovir (VALTREX) 1000 mg tablet; Take 1 tablet (1,000 mg) by mouth daily for 5 days  Dispense: 5 tablet; Refill: 0  - Massage ordered for FSA     3. Healthcare maintenance  - multivitamins w/minerals tablet; Take 1 tablet by mouth daily  Dispense:      4. Vitamin D deficiency  She has a history of vitamin D deficiency per self report and will treat empirically for that, especially [place].   - vitamin D3 (CHOLECALCIFEROL) 50 mcg (2000 units) tablet; Take 1 tablet (50 mcg) by mouth daily  Dispense: 90 tablet; Refill: 3    5. Obesity (BMI 35.0-39.9 without comorbidity)  6. Screening for diabetes mellitus  7. Screening for hyperlipidemia  She is obese with a BMI of 37.6, but does eat a well-balanced diet and tries to remain physically active. Nevertheless, is at increased risk of hyperlipidemia and diabetes, so will screen for both.   - Hemoglobin A1c ([facility]); Future  - Lipid Panel ([facility]); Future    8. Routine screening for STI (sexually transmitted infection)  9. Screening for HIV (human immunodeficiency virus)  She is not currently sexually active, but would like STI screening. Discussed we are deferring low-risk screens at this point for GC/chlamydia, but can test for HIV and syphilis.   - Treponema Abs w Reflex to RPR and  Titer; Future  - HIV Antigen Antibody Combo; Future  - Hepatitis C antibody; Future    10. Need for hepatitis C screening test  - Hepatitis C antibody; Future    11. Dry nares  Dry air in home w/ dry nares and would like humidifier, which is reasonable. Ordered.   - HUMIDIFIER DURABLE GLASS/PLASTIC    12. Lactose intolerance  Not confirmed, but patient reports history of gassiness and diarrhea; especially w/ lactose consumption. Hasn't tried complete elimination. Discussed benefits vs risks of elimination diet vs empiric lactase treatment. Will try lactase and if symptoms improved, suspect lactose intolerant present. If symptoms not improved, would still proceed w/ elimination diet. Pt also asked for probiotic for general gut health, which is reasonable.   - saccharomyces boulardii (FLORASTOR) 250 MG capsule; Take 1 capsule (250 mg) by mouth 2 times daily  Dispense: 180 capsule; Refill: 0  - lactase (LACTAID) 3000 UNIT tablet; Take 1 tablet (3,000 Units) by mouth 3 times daily (with meals)  Dispense: 270 tablet; Refill: 0    13. Reproductive health/screening  14. Contraception counseling  Patient will try to track down records from [facility] re: last pap smear in ~2018. If able to find and NIL/HPV negative, can defer repeat pap until 2023. Otherwise, recommend repeat pap and HPV testing within next year. Not currently on any contraceptive and not sexually active. Patient will seek care if she becomes sexually active and/or desires contraception.     15. Breast cancer screening discussion  Pt asked if due for mammogram. No family history of breast cancer. Discussed briefly not recommended until at least age 40 if no fam hx. Discussed obesity as risk factor. Discussed minimal alcohol use as protective factor. Will discuss further at age 39,     16. Snores  Discussed she is at risk of MAURICE, cam w/ obesity and snoring. Not feeling tired during day or headaches in morning. Could do sleep test in future if pt desires.  Also discussed potential consequences of MAURICE.     Invited to MyChart. Follow-up annually, or earlier with new concerns.   Patient will call to schedule lab-only visit for above las, ordered as future labs.     LUCAS ORTEGA precepted with Dr. Ramos.          PAULINA Gamez presents to clinic today as a NEW patient for a complete physical exam.     New to clinic, but her daughter comes here.   Mostly wants to establish care because she hasn't had a PCP in years. Has a few things she wants to check in on, but knows that we can't do a whole ton of it right now as this is virtual.     Questions:   - Digestive stuff. Possibly test for lactose intolerance? Or answer why digestive stuff screwed up? Has lots of gas and diarrhea. Has tried cutting out some dairy, but still eating cheese and butter and whatnot. Discussed elimination diet vs empiric treatment w/ lactase.   - Lots of skin tags. Why? What do we do?   - Valcyclovir. Would like standing order for prescription to treat cold sores as they come on. Has had cold sores since she was little. Uses valtrex anywhere from once to five times per year. Last use a couple of months ago. Also uses abreva topically.   - Humidifier  - Massage  - Multivitamins  - Vit D- Wants prescription for vit d and wants labs to check levels. Discussed utility of vit d lab is low if asymptomatic, and patient understood and will defer this lab.  - Probiotics  - Labs drawn at some point for cholesterol, blood glucose. No symptoms, but paranoid.   - Keeps meaning to start therapy, but lots going on. Has history of depression, but feels pretty under control right now. Life is just stressful.   - Not currently sexually active. Doesn't have concerns about that right now. Has had ring in past and liked it. Has considered getting another or getting an IUD. Would like STI testing as screening measure. No vaginal burning, itching, or other symptoms. When sexually active (but not currently active),  "has male and female partners. Has had HPV, but s/p LEEP. Hasn't had genital herpes. Hasn't had any STI.   - Has some chronic back pain that comes and goes and is improved with ibuprofen. Some right ankle concerns --if she stresses it, it gets very painful periodically.    Reviewed and updated past medical history, surgical history, family history, and social history, as reflected below and in the EMR.     Past Medical History:   Diagnosis Date     Anxiety      Depressive disorder      H/O LEEP 2005     Herpes 1995     Past Surgical History:   Procedure Laterality Date     COLPOSCOPY CERVIX, LOOP ELECTRODE BIOPSY, COMBINED  2005     HC TOOTH EXTRACTION W/FORCEP  2002    Under general anesthesia. Tolerated without concern.      Family History   Problem Relation Age of Onset     Depression Mother      Hypertension Father      Hyperlipidemia Father      Coronary Artery Disease Father      Hypertension Paternal Grandfather      Hyperlipidemia Paternal Grandfather      Depression Brother      Depression Sister             Social History     Social History     Social History Narrative    As of 2020:     - Works as .     - Lives with daughter (Miles, 2yo), partner, and their three other family members/roommates.     - No tobacco use. Minimal alcohol use (2 drinks per week). No illicit drug use.     - Physical activity: walk a lot on the job, walks the dogs. Used to go to [facility] pre-covid.     - Diet: \"I generally eat pretty well. I eat vegetarian most of the time, but not all of the time. I eat lots of vegetables. I cook dinner pretty much every night!\"        Sexual Health   Sexual concerns: No   STI History: Neg  Pregnancy History:   LMP 2020, periods q28d, last 4d, not too heavy  Last Pap Smear Date: 2018, NIL, HPV negative. All normal paps since LEEP (~). Previously has gone to [facility] for paps and STI testing/     Recommended Screening     Pap/HPV " "cotest every 5 years for women 30-65   See A/P  and HIV screening:  Recommended and patient accepted testing.  Screen for DM and hyperlipidemia d/t obesity.          Review of Systems:   CONSTITUTIONAL: NEGATIVE for fever, chills, change in weight  INTEGUMENTARY/SKIN: NEGATIVE for worrisome rashes, moles or lesions  EYES: NEGATIVE for vision changes or irritation  ENT/MOUTH: **Occasional HSV flares, none in ~2 months. NEGATIVE for ear, mouth and throat problems  RESP: NEGATIVE for significant cough or SOB  BREAST: NEGATIVE for masses, tenderness or discharge  CV: NEGATIVE for chest pain, palpitations or peripheral edema  GI: Has diarrhea a lot and stinky farts a lot. This comes and goes. NEGATIVE for nausea, abdominal pain, heartburn.  : NEGATIVE for frequency, dysuria, or hematuria  MUSCULOSKELETAL: Has some chronic back pain that comes and goes and is improved with ibuprofen. Some right ankle concerns --if she stresses it, it gets very painful periodically. NEGATIVE for significant arthralgias or myalgia  NEURO: NEGATIVE for weakness, dizziness or paresthesias  ENDOCRINE: NEGATIVE for temperature intolerance, skin/hair changes  HEME/ALLERGY: NEGATIVE for bleeding problems  PSYCHIATRIC: NEGATIVE for changes in mood or affect  Sleep:   Do you snore most or the night (as reported by a family member)? Yes  Do you feel sleepy or extremely tired during most of the day? No         Physical Exam:     Vitals: Ht 1.702 m (5' 7\")   Wt 108.9 kg (240 lb)   BMI 37.59 kg/m    BMI= Body mass index is 37.59 kg/m .  LMP 11/20/2020.  General: Patient appears comfortable. Sitting upright and engages easily in conversation. In car (parked) wearing hat and work clothes.   Lungs: Speaking in complete sentences on room air without increased effort.   Psych: Mood good. Affect is congruent. Speech is normal rate and volume. Thought process is logical and goal-oriented.   Neuro: Mental status is normal. Alert and oriented to person, " "place, time, and event.    Skin: No suspicious lesions on exposed skin.   MSK: Observed full ROM in arms.    Eyes: Eyes grossly normal to inspection. Extraocular movements intact. Sclera white.   Neck: Supple. ROM intact.   HENT: Two ears without gross abnormality externally. Nose appears normal. Mouth appears normal with no lesions on lips.           Video Visit Consent   Vera is being evaluated via a billable video visit.    Patient was verbally read the following and verbal consent was obtained.  \"This video visit will be conducted via a call between you and your physician/provider. We have found that certain health care needs can be provided without the need for an in-person physical exam.  This service lets us provide the care you need with a video conversation. If a prescription is necessary we can send it directly to your pharmacy.  If lab work is needed we can place an order for that and you can then stop by our lab to have the test done at a later time.    If during the course of the call the physician/provider feels a video visit is not appropriate, you will not be charged for this service.\"     (Name person giving consent:  Patient   Date verbal consent given:  12/4/2020  Time verbal consent given:  9:12 AM)    Video-Visit Details  Type of service:  Video Visit  Video Start Time: 9:10 AM  Video End Time (time video stopped): 10:00 AM (Did last 31 minutes on phone due to connectivity issues.)  Originating Location (pt. Location): Other Work  Distant Location (provider location):  [facility]  Mode of Communication:  Video Conference via [employer]      "

## 2020-12-24 ENCOUNTER — MYC MEDICAL ADVICE (OUTPATIENT)
Dept: FAMILY MEDICINE | Facility: CLINIC | Age: 35
End: 2020-12-24

## 2020-12-24 RX ORDER — MULTIVIT-MIN/IRON FUM/FOLIC AC 7.5 MG-4
1 TABLET ORAL DAILY
COMMUNITY
Start: 2020-12-24 | End: 2024-07-15

## 2020-12-28 ENCOUNTER — TELEPHONE (OUTPATIENT)
Dept: FAMILY MEDICINE | Facility: CLINIC | Age: 35
End: 2020-12-28

## 2020-12-28 NOTE — TELEPHONE ENCOUNTER
Spoke with patient who needs the orders to send to her [employer]SA account. Orders have been placed into a letter format and released to patient's MyChart per her request.    Leyla Mccallum RN

## 2020-12-28 NOTE — TELEPHONE ENCOUNTER
Patient called stating in order for her FSA to cover her prescriptions she needs a more detailed paper explaining what she is prescribed with her name and the doctor's name. States on [employer] she's able to print the AVS, but FSA is saying that is not enough. States she needs her name and the doctors name and the prescriptions. States the med list is not enough either. Please advise.

## 2021-01-15 ENCOUNTER — HEALTH MAINTENANCE LETTER (OUTPATIENT)
Age: 36
End: 2021-01-15

## 2021-09-18 ENCOUNTER — HEALTH MAINTENANCE LETTER (OUTPATIENT)
Age: 36
End: 2021-09-18

## 2022-11-19 ENCOUNTER — HEALTH MAINTENANCE LETTER (OUTPATIENT)
Age: 37
End: 2022-11-19

## 2023-09-21 ENCOUNTER — OFFICE VISIT (OUTPATIENT)
Dept: URGENT CARE | Facility: URGENT CARE | Age: 38
End: 2023-09-21
Payer: COMMERCIAL

## 2023-09-21 VITALS
HEART RATE: 72 BPM | DIASTOLIC BLOOD PRESSURE: 80 MMHG | TEMPERATURE: 97.6 F | BODY MASS INDEX: 36.02 KG/M2 | OXYGEN SATURATION: 99 % | WEIGHT: 230 LBS | SYSTOLIC BLOOD PRESSURE: 125 MMHG

## 2023-09-21 DIAGNOSIS — M54.50 LUMBOSACRAL PAIN: Primary | ICD-10-CM

## 2023-09-21 PROCEDURE — 99203 OFFICE O/P NEW LOW 30 MIN: CPT | Performed by: PHYSICIAN ASSISTANT

## 2023-09-21 ASSESSMENT — ENCOUNTER SYMPTOMS
DYSURIA: 0
FEVER: 0
BACK PAIN: 1

## 2023-09-21 NOTE — PROGRESS NOTES
SUBJECTIVE:   Vera Eddy is a 38 year old female presenting with a chief complaint of   Chief Complaint   Patient presents with    Urgent Care     Ongoing lower back pain x a month ago injured it   Tried other therapies w/o relief        She is a new patient of [facility].  Patient presents with bilateral lower back pain x 1 month after foam rolling.  Hx of LBP.  No leg pain.  No known back problems.  States worse with sitting.    Treatment:  massage therapist, chiro, ibuprofen (800 BID)  and tylenol     Review of Systems   Constitutional:  Negative for fever.   Genitourinary:  Negative for dysuria.   Musculoskeletal:  Positive for back pain.   Skin:  Negative for rash.   All other systems reviewed and are negative.      Past Medical History:   Diagnosis Date    Anxiety     CARDIOVASCULAR SCREENING; LDL GOAL LESS THAN 160 2/16/2016    Cervical high risk HPV (human papillomavirus) test positive 3/3/16    Depressive disorder     H/O LEEP 01/01/2005    Herpes 01/01/1995     Family History   Problem Relation Age of Onset    Depression Mother     Hypertension Father     Hyperlipidemia Father     Coronary Artery Disease Father     Hypertension Paternal Grandfather     Hyperlipidemia Paternal Grandfather     Depression Brother     Depression Sister     Breast Cancer No family hx of     Cerebrovascular Disease No family hx of      Current Outpatient Medications   Medication Sig Dispense Refill    EPINEPHrine (ANY BX GENERIC EQUIV) 0.3 MG/0.3ML injection 2-pack INJECT 0.3ML INTRAMUSCULARLY ONCE AS NEEDED FOR ANAPHYLAXIS      EPINEPHrine (EPIPEN) 0.3 MG/0.3ML injection Inject 0.3 mLs (0.3 mg) into the muscle once as needed for anaphylaxis 2 each 1    ibuprofen (ADVIL/MOTRIN) 200 MG capsule Take 1-2 capsules (200-400 mg) by mouth every 4 hours as needed for pain or fever      lactase (LACTAID) 3000 UNIT tablet Take 1 tablet (3,000 Units) by mouth 3 times daily (with meals) 270 tablet 0    LORazepam (ATIVAN) 1 MG tablet Take  1 tablet (1 mg) by mouth every 8 hours as needed for anxiety 6 tablet 0    multivitamins w/minerals tablet Take 1 tablet by mouth daily      multivitamins w/minerals tablet Take 1 tablet by mouth daily      saccharomyces boulardii (FLORASTOR) 250 MG capsule Take 1 capsule (250 mg) by mouth 2 times daily 180 capsule 0    tiZANidine (ZANAFLEX) 4 MG tablet Take 1 tablet (4 mg) by mouth 3 times daily 30 tablet 0    vitamin D3 (CHOLECALCIFEROL) 50 mcg (2000 units) tablet Take 1 tablet (50 mcg) by mouth daily 90 tablet 3    gabapentin (NEURONTIN) 300 MG capsule Take 1 capsule (300 mg) by mouth 3 times daily 90 capsule 1    metroNIDAZOLE (FLAGYL) 500 MG tablet Take 1 tablet (500 mg) by mouth 2 times daily 14 tablet 0    valACYclovir (VALTREX) 1000 mg tablet Take 1 tablet (1,000 mg) by mouth daily for 5 days 5 tablet 0     Social History     Tobacco Use    Smoking status: Never    Smokeless tobacco: Never   Substance Use Topics    Alcohol use: Yes     Comment: 2 drinks per week       OBJECTIVE  /80   Pulse 72   Temp 97.6  F (36.4  C) (Tympanic)   Wt 104.3 kg (230 lb)   SpO2 99%   BMI 36.02 kg/m      Physical Exam  Vitals and nursing note reviewed.   Constitutional:       Appearance: Normal appearance. She is obese.   Cardiovascular:      Rate and Rhythm: Normal rate.   Musculoskeletal:      Comments: Patient is able to ambulate, heel walk, toe walk, flex, extend, side flex and twist normally.  SLR negative strength in lower extremities normal.  Warmth to extremities.  Skin over back normal.  No tenderness to bony spine.  Bilateral PSIS tenderness.   Neurological:      Mental Status: She is alert.         Labs:  No results found for this or any previous visit (from the past 24 hour(s)).    X-Ray was not done.    ASSESSMENT:      ICD-10-CM    1. Lumbosacral pain  M54.50 tiZANidine (ZANAFLEX) 4 MG tablet     Physical Therapy Referral           Medical Decision Making:    Differential Diagnosis:  Back Pain:  lumbosacral strain    Serious Comorbid Conditions:  Adult:   reviewed    PLAN:    Ibuprofen TID, PT and rx for zanaflex.  No driving or etoh while taking zanaflex.  Follow up with PCP.  Discussed reasons to seek immediate medical attention.        Followup:    If not improving or if condition worsens, follow up with your Primary Care Provider, If not improving or if conditions worsens over the next 12-24 hours, go to the Emergency Department    There are no Patient Instructions on file for this visit.

## 2024-01-28 ENCOUNTER — HEALTH MAINTENANCE LETTER (OUTPATIENT)
Age: 39
End: 2024-01-28

## 2024-06-16 ENCOUNTER — HEALTH MAINTENANCE LETTER (OUTPATIENT)
Age: 39
End: 2024-06-16

## 2024-07-15 ENCOUNTER — OFFICE VISIT (OUTPATIENT)
Dept: URGENT CARE | Facility: URGENT CARE | Age: 39
End: 2024-07-15
Payer: COMMERCIAL

## 2024-07-15 VITALS
SYSTOLIC BLOOD PRESSURE: 112 MMHG | RESPIRATION RATE: 18 BRPM | OXYGEN SATURATION: 98 % | HEART RATE: 70 BPM | DIASTOLIC BLOOD PRESSURE: 77 MMHG | TEMPERATURE: 98.7 F | WEIGHT: 228 LBS | BODY MASS INDEX: 35.71 KG/M2

## 2024-07-15 DIAGNOSIS — L02.224 BOIL OF GROIN: Primary | ICD-10-CM

## 2024-07-15 PROCEDURE — 87070 CULTURE OTHR SPECIMN AEROBIC: CPT | Performed by: PHYSICIAN ASSISTANT

## 2024-07-15 PROCEDURE — 87077 CULTURE AEROBIC IDENTIFY: CPT | Performed by: PHYSICIAN ASSISTANT

## 2024-07-15 PROCEDURE — 56405 I&D VULVA/PERINEAL ABSCESS: CPT | Performed by: PHYSICIAN ASSISTANT

## 2024-07-15 RX ORDER — SULFAMETHOXAZOLE/TRIMETHOPRIM 800-160 MG
1 TABLET ORAL 2 TIMES DAILY
Qty: 14 TABLET | Refills: 0 | Status: SHIPPED | OUTPATIENT
Start: 2024-07-15 | End: 2024-07-22

## 2024-07-15 ASSESSMENT — PAIN SCALES - GENERAL: PAINLEVEL: SEVERE PAIN (6)

## 2024-07-15 NOTE — PROGRESS NOTES
Assessment & Plan     Boil of groin  Affected area cleaned with betadine x 1 then wiped away with alcohol.  2 pecent lidocaine with epineprhine used to infiltrate the area with good anesthesia.  Number 11 scapel used to make a stab incion.  Purlent drainage was removed, sample collected for culture. No gauze was needed as area was small. Appropraite wound care dressing applied.  Pt tolerated preocedure well. Treat infection with Bactrim as follows. Recommend avoiding soaking x 1 week.   - sulfamethoxazole-trimethoprim (BACTRIM DS) 800-160 MG tablet; Take 1 tablet by mouth 2 times daily for 7 days  - Abscess Aerobic Bacterial Culture Routine Without Gram Stain  - DRAIN SKIN ABSCESS SIMPLE/SINGLE    Return in about 1 week (around 7/22/2024) for visit with primary care provider if not improving.     Ines Campos PA-C  [facility]    Subjective   Vera Eddy is a 39 year old who presents for the following health issues     Patient presents with:  Derm Problem: Cyst on labia (left)       HPI    Presents to  with cyst on labia.    Recurrent abscess on L labia majora. She normally self treats with warm compresses, but was out of town over the weekend and the cyst got much worse than normal and is more painful. She feels its now infected.     Pain with sitting and walking.       Review of Systems   ROS negative except as stated above.      Objective    /77 (BP Location: Left arm, Patient Position: Sitting, Cuff Size: Adult Large)   Pulse 70   Temp 98.7  F (37.1  C) (Oral)   Resp 18   Wt 103.4 kg (228 lb)   SpO2 98%   BMI 35.71 kg/m    Physical Exam   GENERAL: alert and no distress   (female): normal urethral meatus , normal vaginal mucosa, Bartholin's gland normal, and single fluctuant cyst on L inferior labia majora ~1.5 cm

## 2024-07-18 LAB
BACTERIA ABSC ANAEROBE+AEROBE CULT: ABNORMAL

## 2024-12-29 ENCOUNTER — HEALTH MAINTENANCE LETTER (OUTPATIENT)
Age: 39
End: 2024-12-29